# Patient Record
Sex: FEMALE | Race: WHITE | Employment: FULL TIME | URBAN - METROPOLITAN AREA
[De-identification: names, ages, dates, MRNs, and addresses within clinical notes are randomized per-mention and may not be internally consistent; named-entity substitution may affect disease eponyms.]

---

## 2019-07-11 ENCOUNTER — MEDICAL CORRESPONDENCE (OUTPATIENT)
Dept: HEALTH INFORMATION MANAGEMENT | Facility: CLINIC | Age: 64
End: 2019-07-11

## 2019-07-18 ENCOUNTER — REFERRAL (OUTPATIENT)
Dept: TRANSPLANT | Facility: CLINIC | Age: 64
End: 2019-07-18

## 2019-07-18 DIAGNOSIS — Q61.3 POLYCYSTIC KIDNEY: ICD-10-CM

## 2019-07-18 DIAGNOSIS — E66.9 OBESITY: ICD-10-CM

## 2019-07-18 DIAGNOSIS — E11.9 DIABETES MELLITUS, TYPE 2 (H): Primary | ICD-10-CM

## 2019-07-18 DIAGNOSIS — Z01.818 PRE-TRANSPLANT EVALUATION FOR KIDNEY TRANSPLANT: ICD-10-CM

## 2019-07-18 DIAGNOSIS — Z76.82 ORGAN TRANSPLANT CANDIDATE: ICD-10-CM

## 2019-07-18 DIAGNOSIS — I10 HYPERTENSION: ICD-10-CM

## 2019-07-18 NOTE — LETTER
July 25, 2019    Radha De Jesus   [address]  [facility] [address]      Dear Radha,    Thank you for your interest in the Transplant Center at [facility], [facility]. We look forward to being a part of your care team and assisting you through the transplant process.    As we discussed, your transplant coordinator is Ping Randhawa (Kidney).  You may call your coordinator at any time with questions or concerns.   [phone]    Please complete the following.    1. Fill out and return the enclosed forms    Authorization for Electronic Communication    Authorization to Discuss Protected Health Information    Authorization for Release of Protected Health Information    2. Sign up for:    [employer], access to your electronic medical record (see enclosed pamphlet)    [email or web address], a transplant education website    You can use these tools to learn more about your transplant, communicate with your care team, and track your medical details    Sincerely,    Solid Organ Transplant  [facility], [facility]    cc: Maribell Sanchez (PCP), Nithya Steen MD

## 2019-07-18 NOTE — Clinical Note
Adilene gave me dates for Pre K  Mini eval 9/9/ or 9/23.Notified patient that they will hear from a Transplant  to schedule evaluation. Arina wrote orders today 7/25/19.  Patient reports not on dialysis.

## 2019-07-19 VITALS — BODY MASS INDEX: 45.16 KG/M2 | WEIGHT: 230 LBS | HEIGHT: 60 IN

## 2019-07-19 ASSESSMENT — MIFFLIN-ST. JEOR: SCORE: 1524.77

## 2019-07-19 NOTE — TELEPHONE ENCOUNTER
PCP: Maribell Sanchez    Referring Provider: Nithya Steen  Referring Diagnosis:     Insurance information:    Policy palacios:  SELF  Subscriber/policy/ID number:  [id number]  Group Number:  [id number]    Is patient in a group home/assisted living? NO  Does patient have a guardian? NO    Referral intake process completed.  Patient is aware that after financial approval is received, medical records will be requested.   Patient confirmed for a callback from transplant coordinator on 7/30/19 (within 2 weeks)  Tentative evaluation date Not scheduled. (within 4 weeks)    Confirmed coordinator will discuss evaluation process in more detail at the time of their call.   Patient is aware of the need to arrange age appropriate cancer screening, vaccinations, and dental care.  Reminded patient to complete questionnaire, complete medical records release, and review packet prior to evaluation visit .  Assessed patient for special needs (ie--wheelchair, assistance, guardian, and ):  NONE   Patient instructed to call [phone] with questions.

## 2019-07-25 NOTE — TELEPHONE ENCOUNTER
"   I contacted patient and introduced myself( Arina Crabtree RN ) as working with her TX Coordinator(Radha Randhawa)today.I also introduced the role of the Transplant Coordinator in the transplant process.  Explained the purpose of this call including reviewing next steps and answering questions.  Confirmed Referring Provider, Dialysis Center, and Primary Care Physician. Notified patient of the importance of continued communication with referring providers and primary care physicians.    Reviewed components of transplant evaluation process including necessary appointments, tests, and procedures.    Answered questions for patient regarding evaluation, provided Ping Randhawa's  contact information and requested they call with any additional questions.    Determined that patient would like additional information regarding transplant by:      Mail, Phone Call   Adilene gave me dates for Pre K  Mini eval 9/9/ or 9/23.Notified patient that they will hear from a Transplant  to schedule evaluation. Arina wrote orders today 7/25/19.     Patient states she is not on dialysis.Patient verbalized understanding of the plan.    H&P from Dr Hinson s care everywhere   PKD  any family w txs    Type 2 DM    hypertension 1/27/2013  few MRI of brain - NO aneurysm   ESRD on dialysis  Obesity (5' 2.75\")  Wt 107 kg (236 lb)  BMI 42.14 kg/m      Tachycardia 01/27/2013     Depression with anxiety 01/27/2013 On no medications    Thrombocytopenia, unspecified 01/27/2013    Past Medical History:     Colon polyps 2013     Sleep apnea       "

## 2019-09-16 ENCOUNTER — TELEPHONE (OUTPATIENT)
Dept: TRANSPLANT | Facility: CLINIC | Age: 64
End: 2019-09-16

## 2019-09-23 ENCOUNTER — DOCUMENTATION ONLY (OUTPATIENT)
Dept: TRANSPLANT | Facility: CLINIC | Age: 64
End: 2019-09-23

## 2019-09-23 ENCOUNTER — OFFICE VISIT (OUTPATIENT)
Dept: TRANSPLANT | Facility: CLINIC | Age: 64
End: 2019-09-23
Attending: PHYSICIAN ASSISTANT
Payer: COMMERCIAL

## 2019-09-23 ENCOUNTER — ALLIED HEALTH/NURSE VISIT (OUTPATIENT)
Dept: TRANSPLANT | Facility: CLINIC | Age: 64
End: 2019-09-23
Attending: TRANSPLANT SURGERY
Payer: COMMERCIAL

## 2019-09-23 VITALS
HEIGHT: 62 IN | WEIGHT: 237 LBS | WEIGHT: 237 LBS | BODY MASS INDEX: 43.61 KG/M2 | HEIGHT: 62 IN | BODY MASS INDEX: 43.61 KG/M2

## 2019-09-23 DIAGNOSIS — Z76.82 ORGAN TRANSPLANT CANDIDATE: ICD-10-CM

## 2019-09-23 DIAGNOSIS — Z01.818 PRE-TRANSPLANT EVALUATION FOR CHRONIC KIDNEY DISEASE: Primary | ICD-10-CM

## 2019-09-23 DIAGNOSIS — Q61.3 POLYCYSTIC KIDNEY: ICD-10-CM

## 2019-09-23 DIAGNOSIS — Z76.82 ORGAN TRANSPLANT CANDIDATE: Primary | ICD-10-CM

## 2019-09-23 DIAGNOSIS — Z01.818 PRE-TRANSPLANT EVALUATION FOR KIDNEY TRANSPLANT: ICD-10-CM

## 2019-09-23 RX ORDER — AMLODIPINE BESYLATE 5 MG/1
5 TABLET ORAL
COMMUNITY
Start: 2018-11-12

## 2019-09-23 RX ORDER — CARVEDILOL 12.5 MG/1
12.5 TABLET ORAL
COMMUNITY
Start: 2018-11-20

## 2019-09-23 RX ORDER — LISINOPRIL 20 MG/1
TABLET ORAL
COMMUNITY
Start: 2018-10-23

## 2019-09-23 RX ORDER — LANCETS
EACH MISCELLANEOUS
COMMUNITY
Start: 2018-12-05

## 2019-09-23 RX ORDER — MAGNESIUM OXIDE 400 MG/1
TABLET ORAL
COMMUNITY
Start: 2018-09-24

## 2019-09-23 RX ORDER — TRIAMCINOLONE ACETONIDE 1 MG/G
OINTMENT TOPICAL
COMMUNITY
Start: 2019-09-06

## 2019-09-23 RX ORDER — ATORVASTATIN CALCIUM 10 MG/1
10 TABLET, FILM COATED ORAL
COMMUNITY
Start: 2019-05-13

## 2019-09-23 RX ORDER — BLOOD SUGAR DIAGNOSTIC
STRIP MISCELLANEOUS
Refills: 0 | COMMUNITY
Start: 2018-12-05

## 2019-09-23 RX ORDER — FUROSEMIDE 20 MG
20 TABLET ORAL
COMMUNITY
Start: 2019-08-09

## 2019-09-23 RX ORDER — NATEGLINIDE 60 MG/1
60 TABLET ORAL
COMMUNITY
Start: 2019-08-19

## 2019-09-23 RX ORDER — INSULIN PUMP SYRINGE, 3 ML
EACH MISCELLANEOUS
COMMUNITY
Start: 2018-12-05

## 2019-09-23 ASSESSMENT — MIFFLIN-ST. JEOR
SCORE: 1588.27
SCORE: 1591.52

## 2019-09-23 NOTE — PROGRESS NOTES
Outpatient MNT: Kidney Transplant Evaluation/Weight Loss Clinic Hybrid    Current BMI: 43.3 (HT 62 in,  lbs/108 kg)  BMI is outside criteria of <35 for kidney transplant  Goal BMI<35 for kidney transplant or <191 lbs (46 lb loss)     Time Spent: 15 minutes  Visit Type: Initial  Referring Physician: Finger  Pt accompanied by: her , Ventura     Medical dx associated with RD referral  - DM II  - CKD IV from PKD    History of previous txp: none  Dialysis: no    Pt may be interested in sleeve gastrectomy with Dr Elmore. This is pt's first of 3 out of 6 RD visits required prior to surgery.    Required weight loss goal prior to weight loss surgery: weight maintenance    Nutrition Assessment  Pt cooks at home, following a low sodium, lower protein diet. In general, she does not prefer meat and eats meat 2x/month. She does not eat soy. Does some eggs, dairy, beans/lentils.     Vitamins, Supplements, Pertinent Meds: magnesium   Herbal Medicines/Supplements: none     Pt reports attending a lifestyle change program 4 years ago, losing 15 lbs x 2 weeks, maintaining weight loss x 1 year. This was reportedly difficult to maintain.     Diet Recall  Breakfast Plain oats made with water + 1-2 toast with butter + 1 piece of fruit (banana, berries, apple)   Lunch Deli meat s/w (not LS) + apple, carrots    Dinner Potatoes (4x/week) or pasta + veggies or salad (trying to increase)   Snacks Chocolate a few times/week    Beverages Water, occasional milk    Alcohol None    Dining out 2x/month      Physical Activity  Just finished PT after knee surgery last week  Plans to start routine activity, perhaps pool therapy a few times/week      Anthropometrics  Height:   62 in   BMI:    43.3    Weight Status:Obesity Grade III BMI >40   Weight:  237 lbs            IBW (lb): 110  % IBW: 215    Wt Hx: Pt reports mild MEHUL, with overall stable weight.     Adj/dosing BW: 142 lbs/64 kg       Malnutrition  % Intake: No decreased intake noted  %  Weight Loss: None noted  Subcutaneous Fat Loss: None  Muscle Loss: None  Fluid Accumulation/Edema: Mild   Malnutrition Diagnosis: Patient does not meet two of the above criteria necessary for diagnosing malnutrition     Estimated Nutrition Needs  Energy  1280     (20 kcal/kg dosing BW for desired wt loss)       Protein  38-51    (0.6-0.8 g/kg for CKD)         Fluid  1 ml/kcal or per MD   Micronutrient   Na+: <2000 mg/day  K+: 7300-8039 mg/day  Phos: 800-1000 mg/day            Nutrition Diagnosis  Obesity r/t long history of self-monitoring deficit and excessive energy intake aeb BMI >30.    Food and nutrition related knowledge deficit r/t pre kidney transplant eval AEB pt verbalized not hearing pre/post transplant diet guidelines.    Nutrition Intervention  Nutrition education provided:  Discussed strategies for weight loss:   - Pt thinks reducing her work hours will allow her to focus more on lifestyle changes. She may be interested in med management or weight loss surgery, but would like to see if she could do it on her own first. We discussed that ideally she should include more proteins in her diet in order to reduce emphasis of carbs at meal times. Could consider eggs with toast and fruit instead of 3 carbs at breakfast (or Greek yogurt).     Discussed sodium intake (low sodium foods and drinks, seasoning food without salt and tips for low sodium diet). Discussed borderline elevated K level (5.3) on 7/8, with h/o high levels since 12/2018. Would limit intake of bananas and tomatoes. Also reviewed difference b/t animal and plant proteins in relation to kidney disease, yet generally speaking, should avoid exceeding much more than 50 g/day total protein.     Emailed pt resources: diabetic and kidney friendly cook books, recipes/meal ideas, seasonings to use instead of salt, and low/high K foods.     Reviewed post txp diet guidelines in brief (will review in further detail post txp):  (1) Review of proper food  safety measures d/t immunosuppressant therapy post-op and increased risk for food-borne illness    (2) Avoid the following post txp d/t risk for rejection, unknown effects on the organs, and/or potential interactions with immunosuppressants:  - Herbal, Chinese, holistic, chiropractic, natural, alternative medicines and supplements  - Detoxes and cleanses  - Weight loss pills  - Protein powders or other products with extracts or herbs (ie green tea extract)    (3) Med regimen and possible side effects    Patient Understanding: Pt verbalized understanding of education provided.  Expected Compliance: Good  Follow-Up Plans: 1 month or PRN    Nutrition Goals  Transplant-Related  1. Weight loss to goal wt: <191 lbs (BMI <35 or per MD) for transplant and weight maintenance for potential weight loss surgery  2. Limit Na+ <2000mg/day  3. Pt to verbalize understanding of 3 aspects of post txp education provided  ______________________________________________________________________  Bariatric Surgery-Related  1. Eat slowly (20-30 minutes per meal), chewing foods well (25 chews per bite of food)  2. Eliminate calorie-containing beverages  3. Plate method (1/2 non-starchy vegetables/fruit (kidney friendly if applicable), 1/4 lean protein, 1/4 whole grain starch - no more than 1 cup carb/meal)  4. Increase activity level.     Provided pt with contact info.   Jaelyn Guillaume RD, LD  [facility] [phone]

## 2019-09-23 NOTE — LETTER
2019       RE: Radha De Jesus  [address]  [facility] [address]     Dear Colleague,    Thank you for referring your patient, Radha De Jesus, to the [facility] SOLID ORGAN TRANSPLANT at [facility]. Please see a copy of my visit note below.      Transplant Surgery      Reason For Visit: Pre-evaluation for kidney transplant waiting list    History of Present Illness:  CKD from PCK and Type II DM.  Referred for kidney transplant evalulation, but BMI 42.  Will need to lose 30 lb to get to BMI 35.         History of seizures in past.    History of recurrent UTIs.  On occasional prophylactic ABX.    Exam:   There were no vitals taken for this visit.  Well appearing no apparent distress     Impression:  Will likely be a candidate for transplant in the future if she can lose weight to BMI 35.  Renal disease is more pressing than her diabetes, so I favor kidney alone.  If she does a good job at losing weight and gets to BMI 32 prior to achieving transplant, could reconsider simultaneous kidney and pancreas transplant.    Plan: Weight management evaluation.  List inactive when she loses 10 lb or if she engages in ongoing weight management plan.    Discussion:  Ms. De Jesus appears to be a fair candidate for kidney transplantation and has a good understanding of the risks and benefits of this approach to the management of renal failure.     The majority of our visit was spent in counselling, discussing the medical and surgical risks of kidney transplantation. We discussed approximate wait time and how that is influenced by issues such as blood type and sensitization (PRA) and access to a living donor. I contrasted potential waiting time for living vs  donor kidneys from  normal (0-85%) or higher (%) kidney donor profile index (KDPI) donors and their associated outcomes. I would recommend this individual to consider kidneys from high KDPI donors. Potential surgical complications of  kidney transplantation include bleeding, superficial or deep wound complications (infection, hernia, lymphocele), ureteral anastomotic failure (leak or stenosis), graft thrombosis, need for reoperation and other issues such as cardiac complications, pneumonia, deep venous thrombosis, pulmonary embolism, post transplant diabetes and death. The potential for recurrent disease or need for retransplantation was also addressed. We discussed the possible need for ureteral stent (and subsequent removal), and the utility of protocol biopsy and laboratory studies to evaluate for rejection or recurrent disease. We discussed the risk of graft rejection, our center's average graft and patient survival rates, immunosuppression protocols, as well as the potential opportunity to participate in clinical trials.  We also discussed the average length of stay, recovery process, and posttransplant lab and monitoring protocol.  I emphasized the need for strict immunosuppression medication adherence and the potential for complications of immunosuppression such as skin cancer or lymphoma, as well as a very low but not zero risk of donor-derived disease transmission risks (infection, cancer). Ms. De Jesus asked good questions and her candidacy will be reviewed at our Multidisciplinary Selection Committee. Thank you for the opportunity to participate in Ms. De Jesus's care.    Total time 30 min.  Face to face time: 20 min.    Jack Altamirano MD, PhD  Transplant Surgery

## 2019-09-23 NOTE — PATIENT INSTRUCTIONS
Go online to [email or web address] to watch online seminar. SLEEVE GASTRECTOMY    Consider topiramate, discuss with nephrologist    If interested in sleeve gastrectomy surgery call Lalo Welch [phone] to schedule to see Dr Elmore and dietitian in 1 month (meet and greet)    Please call Cassie George LPN at [phone] OR Kareen Silveira [phone] if you would like to start topiramate.    If interested in medical weight management only call [phone] to schedule return medical weight management visit with Starr Beal and dietitian in 1 month.        MEDICATION DISCUSSED AT THIS APPOINTMENT  Start one tab, 25 mg, for a week. Go up to 50 mg (2 tabs) for the next week. At the third week, take  3 tabs (75 mg).  Stay at 3 tabs until you are seen again.     For any questions/concerns contact Cassie George LPN at [phone] or Kareen Silveira RN at [phone]    (Do not stop taking it if you don't think it's working. For some people it works even though they do not feel much different.)    Topiramate (Topamax) is a medication that is used most often to treat migraine headaches or for seizures. It has also been found to help with weight loss. Although it's not currently FDA approved for weight loss, it has been used safely for a number of years to help people who are carrying extra weight.     Just how topiramate helps with weight loss has not been exactly determined. However it seems to work on areas of the brain to quiet down signals related to eating.      Topiramate may make you:    >feel less interest in eating in between meals   >think less about food and eating   >find it easier to push the plate away   >find giving up pop easier    >have an easier time eating less    For some of our patients, the pills work right away. They feel and think quite differently about food. Other patients don't feel much of a change but find in fact they have lost weight! Like all weight loss medications, topiramate works best  when you help it work.  This means:    1) Have less tempting high calorie (fattening) food around the house or office    2) Have lower calorie food (fruits, vegetables,low fat meats and dairy) for snacks    3) Eat out only one time or less each week.   4) Eat your meals at a table with the TV or computer off.    Side-effects. Topiramate is generally well tolerated. The main side-effects we see are:   Tingling in hands,feet, or face (usually not very troublesome)   Mental confusion and word finding trouble (about 10% of patients have this.)     Feeling sleepy or a bit dopey- this goes away very soon after starting.    One of the dangers of topiramate is the possibility of birth defects--if you get pregnant when you are on it, there is the risk that your baby will be born with a cleft lip or palate.  If you are on topiramate and of child bearing age, you need to be on a reliable form of birth control or refrain from sexual intercourse.     Please refer to the pharmacy insert for more information on side-effects. Since many pharmacists are not familiar with the use of topiramate in weight loss, calling the clinic will get you the most accurate information on the use of this medication for weight loss.     In order to get refills of this or any medication we prescribe you must be seen in the medical weight mgmt clinic every 2-3 months. Please have your pharmacy fax a refill request to [phone].

## 2019-09-23 NOTE — LETTER
2019       RE: Radha De Jesus  [address]  [facility] [address]     Dear Colleague,    Thank you for referring your patient, Radha De Jesus, to the [facility] SOLID ORGAN TRANSPLANT at [facility]. Please see a copy of my visit note below.    PATIENT:  Radha De Jesus  :          1955  FARRUKH:          2019    The patient is seen at the consultation request of Dr. Dr Altamirano for obesity.    Patient is pursuing transplant of kidney and needs to lose 46 pounds prior to surgery.    Polycystic kidney disease(PKD).  Stage V kidney disease, not yet on dialysis.    Type 2 DM Dx and started on Starlex in May 2019  A1C has gone from 7.9 to 7.0    Needs to lose to 194 lb to get to BMI <35   Current BMI: 43.3 (HT 62 in,  lbs/108 kg)  BMI is outside criteria of <35 for kidney transplant  Goal BMI<35 for kidney transplant or <191 lbs (46 lb loss)    She has tried lifestyle center and lost 15 lbs. The first 2.5 weeks 4 yrs ago but she had trouble maintaining weight loss    15 lbs is most weight loss ever. Struggled with weight since childhood. Highest weight ever is today's weight    She describes her weight history as a gradual gain.  Her weight gain started as an adolescent.    Her previous attempts at weight loss include medical supervision. Patient has had minimal success losing 15 lbs but unable to maintain weight loss.    Today's weight: 237 lbs 0 oz  Current Body Mass Index:  Body mass index is 43.06 kg/m .    Past Medical History:   Diagnosis Date     Diabetes (H) 2019    Type 2  NIDDM     History of blood transfusion      Hypertension      PKD (polycystic kidney disease)        Work/Social History:  She describes her home life as stable. Resides in an independent environment. Her marital status is . She has no overweight/obese child or spouse. She reports that she has never smoked. She has never used smokeless tobacco. She reports previous alcohol use. She  reports that she does not use drugs.    Diet Recall  Breakfast Plain oats made with water + 1-2 toast with butter + 1 piece of fruit (banana, berries, apple)   Lunch Deli meat s/w (not LS) + apple, carrots    Dinner Potatoes (4x/week) or pasta + veggies or salad (trying to increase)   Snacks Chocolate a few times/week    Beverages Water, occasional milk    Alcohol None    Dining out 2x/month      Radha has an activity pattern that is sedentary.     ASSESSMENT:  Radha is a patient with mature onset obesity without significant element of familial/genetic influence and with current health consequences. She does need aggressive weight loss plan due to BMI 43 and need to lose weight to BMI <35 for kidney transplant. Stage 5 CKD due to PKD.    PLAN:      Go online to [email or web address] to watch online seminar. SLEEVE GASTRECTOMY    Consider topiramate, discuss with nephrologist. Consider GLP1 instead of Starlix to help with DM and weight loss.    If interested in sleeve gastrectomy surgery call Lalo Welch [phone] to schedule to see Dr Elmore and dietitian in 1 month (meet and greet)    Please call Cassie George LPN at [phone] OR Kareen Silveira [phone] if you would like to start topiramate.    If interested in medical weight management only call [phone] to schedule return medical weight management visit with Starr Beal and dietitian in 1 month.    Please see Lauren Bloch in 1 month to discuss weight loss medications topiramate vs GLP1.  Can be same day as visit with Dr Elmore or Starr.        I spent a total of 30 minutes face to face with Radha PRADIP De Jesus during today's office visit.  Over 50% of this time was spent counseling the patient and/or coordinating care.    Starr Beal PA-C

## 2019-09-23 NOTE — PROGRESS NOTES
Transplant Surgery      Reason For Visit: Pre-evaluation for kidney transplant waiting list    History of Present Illness:  CKD from PCK and Type II DM.  Referred for kidney transplant evalulation, but BMI 42.  Will need to lose 30 lb to get to BMI 35.         History of seizures in past.    History of recurrent UTIs.  On occasional prophylactic ABX.    Exam:   There were no vitals taken for this visit.  Well appearing no apparent distress     Impression:  Will likely be a candidate for transplant in the future if she can lose weight to BMI 35.  Renal disease is more pressing than her diabetes, so I favor kidney alone.  If she does a good job at losing weight and gets to BMI 32 prior to achieving transplant, could reconsider simultaneous kidney and pancreas transplant.    Plan: Weight management evaluation.  List inactive when she loses 10 lb or if she engages in ongoing weight management plan.    Discussion:  Ms. De Jesus appears to be a fair candidate for kidney transplantation and has a good understanding of the risks and benefits of this approach to the management of renal failure.     The majority of our visit was spent in counselling, discussing the medical and surgical risks of kidney transplantation. We discussed approximate wait time and how that is influenced by issues such as blood type and sensitization (PRA) and access to a living donor. I contrasted potential waiting time for living vs  donor kidneys from  normal (0-85%) or higher (%) kidney donor profile index (KDPI) donors and their associated outcomes. I would recommend this individual to consider kidneys from high KDPI donors. Potential surgical complications of kidney transplantation include bleeding, superficial or deep wound complications (infection, hernia, lymphocele), ureteral anastomotic failure (leak or stenosis), graft thrombosis, need for reoperation and other issues such as cardiac complications, pneumonia, deep venous  thrombosis, pulmonary embolism, post transplant diabetes and death. The potential for recurrent disease or need for retransplantation was also addressed. We discussed the possible need for ureteral stent (and subsequent removal), and the utility of protocol biopsy and laboratory studies to evaluate for rejection or recurrent disease. We discussed the risk of graft rejection, our center's average graft and patient survival rates, immunosuppression protocols, as well as the potential opportunity to participate in clinical trials.  We also discussed the average length of stay, recovery process, and posttransplant lab and monitoring protocol.  I emphasized the need for strict immunosuppression medication adherence and the potential for complications of immunosuppression such as skin cancer or lymphoma, as well as a very low but not zero risk of donor-derived disease transmission risks (infection, cancer). Ms. De Jesus asked good questions and her candidacy will be reviewed at our Multidisciplinary Selection Committee. Thank you for the opportunity to participate in Ms. De Jesus's care.    Total time 30 min.  Face to face time: 20 min.    Jack Altamirano MD, PhD  Transplant Surgery

## 2019-09-23 NOTE — PATIENT INSTRUCTIONS
Your BMI today is 43.3 (237 lbs)  Goal BMI for kidney transplant <35 or <191 lbs (46 lb weight loss)    Nutrition Goals  1. Eat slowly (20-30 minutes per meal), chewing foods well (25 chews per bite of food)    2. Eliminate calorie-containing beverages    3. Plate method (1/2 non-starchy vegetables/fruit (kidney friendly if applicable), 1/4 lean protein, 1/4 starch (kidney friendly if applicable) - no more than 1 cup carb/meal)    Low potassium vegetables: asparagus, beets, raw broccoli, cabbage, cooked carrots, cauliflower, celery, cucumbers, eggplant, green/wax beans, lettuce, mushrooms, onion, peppers, raw spinach (1/2 cup), zucchini    Low potassium fruit: canned apricot (1/2 cup), apple/applesauce, blueberries, cherries, cranberries, fruit cocktail (1 cup), grapefruit, grapes, mandarin oranges, peaches, pears, plums, pineapple, raspberries, rhubarb, strawberries, tangerine, watermelon (1 cup)    Kidney friendly starches: barley, buckwheat, bulgur, popcorn, wild rice, white rice, pasta    4. Increase activity level.     Jaelyn Guillaume RD, LD  [phone]  [email or web address]

## 2019-09-23 NOTE — PROGRESS NOTES
PATIENT:  Radha De Jesus  :          1955  FARRUKH:          2019    The patient is seen at the consultation request of Dr. Dr Altamirano for obesity.    Patient is pursuing transplant of kidney and needs to lose 46 pounds prior to surgery.    Polycystic kidney disease(PKD).  Stage V kidney disease, not yet on dialysis.    Type 2 DM Dx and started on Starlex in May 2019  A1C has gone from 7.9 to 7.0    Needs to lose to 194 lb to get to BMI <35   Current BMI: 43.3 (HT 62 in,  lbs/108 kg)  BMI is outside criteria of <35 for kidney transplant  Goal BMI<35 for kidney transplant or <191 lbs (46 lb loss)    She has tried [facility] and lost 15 lbs. The first 2.5 weeks 4 yrs ago but she had trouble maintaining weight loss    15 lbs is most weight loss ever. Struggled with weight since childhood. Highest weight ever is today's weight    She describes her weight history as a gradual gain.  Her weight gain started as an adolescent.    Her previous attempts at weight loss include medical supervision. Patient has had minimal success losing 15 lbs but unable to maintain weight loss.    Today's weight: 237 lbs 0 oz  Current Body Mass Index:  Body mass index is 43.06 kg/m .    Past Medical History:   Diagnosis Date     Diabetes (H) 2019    Type 2  NIDDM     History of blood transfusion      Hypertension      PKD (polycystic kidney disease)        Work/Social History:  She describes her home life as stable. Resides in an independent environment. Her marital status is . She has no overweight/obese child or spouse. She reports that she has never smoked. She has never used smokeless tobacco. She reports previous alcohol use. She reports that she does not use drugs.    Diet Recall  Breakfast Plain oats made with water + 1-2 toast with butter + 1 piece of fruit (banana, berries, apple)   Lunch Deli meat s/w (not LS) + apple, carrots    Dinner Potatoes (4x/week) or pasta + veggies or salad (trying to  increase)   Snacks Chocolate a few times/week    Beverages Water, occasional milk    Alcohol None    Dining out 2x/month      Radha has an activity pattern that is sedentary.     ASSESSMENT:  Radha is a patient with mature onset obesity without significant element of familial/genetic influence and with current health consequences. She does need aggressive weight loss plan due to BMI 43 and need to lose weight to BMI <35 for kidney transplant. Stage 5 CKD due to PKD.    PLAN:      Go online to www.[facility].org to watch online seminar. SLEEVE GASTRECTOMY    Consider topiramate, discuss with nephrologist. Consider GLP1 instead of Starlix to help with DM and weight loss.    If interested in sleeve gastrectomy surgery call Lalo Welch [phone] to schedule to see Dr Elmore and dietitian in 1 month (meet and greet)    Please call Cassie George LPN at [phone] OR Kareen Silveira -[phone] if you would like to start topiramate.    If interested in medical weight management only call [phone] to schedule return medical weight management visit with Starr Beal and dietitian in 1 month.    Please see Lauren Bloch in 1 month to discuss weight loss medications topiramate vs GLP1.  Can be same day as visit with Dr Elmore or Starr.        I spent a total of 30 minutes face to face with Radhapepe De Jesus during today's office visit.  Over 50% of this time was spent counseling the patient and/or coordinating care.    Starr Beal PA-C

## 2019-09-23 NOTE — PROGRESS NOTES
"Kidney Transplant Evaluation - 9/23/2019  Radha De Jesus attended the pre-transplant patient education class today. She was accompanied by her  and daughter. They were all attentive and asked a few good questions. The [facility] website pre-transplant modules were viewed; class participants were educated on using the site.     Content reviewed:    Living Donation and how to access that program    Paired exchange    Kidney Donor Profile Index (KDPI)    Waiting list issues (right to decline without penalty, high PHS risk donors, what to expect when called with an offer)    Hospital experience,  length of stay , need to stay locally post-discharge (2-4 weeks)    Surgical options (with pictures)                             Post-surgery lifting and driving restrictions    Post-transplant routines, frequency of lab work and clinic visits    Need to stay locally post-discharge (2-4 weeks)    Role of Transplant Coordinator    Participants were informed of the benefits of transplant as well as potential risks such as infection, cancer, and death.  The need for total adherence with immunosuppression medications and following transplant regimens was stressed.  The overall evaluation/approval/listing process was reviewed.        The patient was provided with the following documents:  What You Need to Know About a Kidney Transplant  Adult Kidney Transplant - A Guide for Patients  SRTR Data Sheet - Kidney  Brochure - Kidney Allocation  Brochure - Multiple Listing and Waiting Time Transfer  What Every Patient Needs to Know (UNOS)  UNOS Facts and Figures  Finding a Donor  [facility] - Quick Start Guide  KDPI Consent  Receipt of Information form    Radha De Jesus signed the  Receipt of Information for Organ Transplant Recipient.\" She was provided Ping Randhawa's business card and instructed to call with additional questions.      Summary    Team s concerns/comments: BMI >42    Candidacy category: " Red    Action/Plan: see bariatrics and transplant surgeon this afternoon    Expected Selection Meeting Discussion: October 2, 2019

## 2019-09-23 NOTE — PROGRESS NOTES
"Kidney Transplant Evaluation - 9/23/2019  Radha De Jesus attended the pre-transplant patient education class today. She was attentive and asked a few good questions. The [facility] website pre-transplant modules were viewed; class participants were educated on using the site.     Content reviewed:    Living Donation and how to access that program    Paired exchange    Kidney Donor Profile Index (KDPI)    Waiting list issues (right to decline without penalty, high PHS risk donors, what to expect when called with an offer)    Hospital experience,  length of stay , need to stay locally post-discharge (2-4 weeks)    Surgical options (with pictures)                             Post-surgery lifting and driving restrictions    Post-transplant routines, frequency of lab work and clinic visits    Need to stay locally post-discharge (2-4 weeks)    Role of Transplant Coordinator    Participants were informed of the benefits of transplant as well as potential risks such as infection, cancer, and death.  The need for total adherence with immunosuppression medications and following transplant regimens was stressed.  The overall evaluation/approval/listing process was reviewed.        The patient was provided with the following documents:  What You Need to Know About a Kidney Transplant  Adult Kidney Transplant - A Guide for Patients  SRTR Data Sheet - Kidney  Brochure - Kidney Allocation  Brochure - Multiple Listing and Waiting Time Transfer  What Every Patient Needs to Know (UNOS)  UNOS Facts and Figures  Finding a Donor  [facility]plant [facility] - Quick Start Guide  [facility] Consent  Receipt of Information form    Radha De Jesus signed the  Receipt of Information for Organ Transplant Recipient.\" She was provided Ping Randhawa's business card and instructed to call with additional questions.      Summary    Team s concerns/comments: Radha was here for a mini evaluation due to BMI. She attended class and will see bariatrics, " surgeon and dietician later this afternoon.    Candidacy category: Red    Action/Plan: Continue with mini evaluation    Expected Selection Meeting Discussion: [place] 10/2/2019

## 2019-10-02 ENCOUNTER — COMMITTEE REVIEW (OUTPATIENT)
Dept: TRANSPLANT | Facility: CLINIC | Age: 64
End: 2019-10-02

## 2019-10-02 NOTE — LETTER
10/02/2019       Radha De Jesus   [phone]  [facility] [address]    Dear Radha,    It was a pleasure to see you for consideration of kidney transplantation. Your pre-transplant evaluation results were reviewed at our Multidisciplinary Selection Committee. The committee is requesting the following items are completed before determining your candidacy:    1. To return to Weight management clinic with Starr Mercer CNP     For any questions, please contact the Transplant Office at [phone].      Sincerely,    Ping Randhawa RN BSN on behalf of   Solid Organ Kidney Transplant Program   [facility]

## 2019-11-06 NOTE — COMMITTEE REVIEW
Abdominal Committee Review Note     Evaluation Date: 9/23/2019  Committee Review Date: 10/2/2019    Organ being evaluated for: Kidney    Transplant Phase: Evaluation  Transplant Status: Active    Transplant Coordinator: Ping Randhawa  Transplant Surgeon:   Jack Altamirano    Referring Physician: Nithya Steen    Primary Diagnosis:   Secondary Diagnosis:     Committee Review Members:  Nephrology Raghu Tobar MD, Shade Jones, APRN CNP, Justin Kothari MD   Nurse Arina Crabtree, RN, Greer Sifuentes, RN   Nutrition Jaelyn Guillaume,    Pharmacy Wolf Giles, [facility]    - Clinical Shoshana Leon, [facility], Jackieanselmo Santiago, [facility]   Transplant Izabella Shukla PA-C, Renetta Kellogg RN, Adilene Cobb, JHONY, Nyasia Collazo, THOMAS, Ping Randhawa, THOMAS, Bishop Green MD, Greer Sifuentes, RN   Transplant Surgery Lisa Bermudez MD, MD       Transplant Eligibility: Irreversible chronic kidney disease treated w/dialysis or expected need for dialysis    Committee Review Decision: Needs Re-presentation    Relative Contraindications: Other, obesity    Absolute Contraindications: Other, obesity    Committee Chair Bishop Green MD verbally attested to the committee's decision.    Committee Discussion rediscuss         Team s concerns/comments: Radha was here for a mini evaluation due to BMI. She attended class and will see bariatrics, surgeon and dietician later this afternoon.

## 2019-11-20 ENCOUNTER — TELEPHONE (OUTPATIENT)
Dept: SURGERY | Facility: CLINIC | Age: 64
End: 2019-11-20

## 2019-11-20 NOTE — TELEPHONE ENCOUNTER
Left VM message inquiring if patient was interested in pursuing bariatric surgery or MWM. If surgery, I need to submit referral to [facility] to get set up for the [facility] phone program. Direct call back number given.

## 2019-11-21 ENCOUNTER — TELEPHONE (OUTPATIENT)
Dept: SURGERY | Facility: CLINIC | Age: 64
End: 2019-11-21

## 2019-12-02 ENCOUNTER — OFFICE VISIT - HEALTHEAST (OUTPATIENT)
Dept: SURGERY | Facility: CLINIC | Age: 64
End: 2019-12-02

## 2019-12-02 DIAGNOSIS — N18.6 END STAGE RENAL DISEASE (H): ICD-10-CM

## 2019-12-31 ASSESSMENT — MIFFLIN-ST. JEOR: SCORE: 1538.02

## 2020-01-01 ENCOUNTER — ANESTHESIA - HEALTHEAST (OUTPATIENT)
Dept: SURGERY | Facility: HOSPITAL | Age: 65
End: 2020-01-01

## 2020-01-02 ENCOUNTER — SURGERY - HEALTHEAST (OUTPATIENT)
Dept: SURGERY | Facility: HOSPITAL | Age: 65
End: 2020-01-02

## 2020-01-20 ENCOUNTER — OFFICE VISIT - HEALTHEAST (OUTPATIENT)
Dept: SURGERY | Facility: CLINIC | Age: 65
End: 2020-01-20

## 2020-01-20 DIAGNOSIS — N18.6 END STAGE RENAL DISEASE (H): ICD-10-CM

## 2020-02-12 ENCOUNTER — TELEPHONE (OUTPATIENT)
Dept: SURGERY | Facility: CLINIC | Age: 65
End: 2020-02-12

## 2020-02-12 NOTE — TELEPHONE ENCOUNTER
"I checked [facility] Well web site today, patient did 2 [facility] calls, then notation is \"discontinue course, ineligible.\" I spoke with Sabra from [employer]. Patient's policy does not cover bariatric surgery.   "

## 2020-02-26 ENCOUNTER — TELEPHONE (OUTPATIENT)
Dept: TRANSPLANT | Facility: CLINIC | Age: 65
End: 2020-02-26

## 2020-02-26 NOTE — TELEPHONE ENCOUNTER
"From: Lalo Welch had said the patient was going to call me if she wanted to pursue surgery. I hadn't heard from her for 2 months so reached out to her.  There's an exclusion main regarding weight surgery. The only option for her would be if she changed insurance at some point.     I called Radha De Jesus to discuss her Current weight and our possible     1) PD daily  2) 2/26/2020   Ht 5'1 1/2\"  .5# BMI 41   3) I discussed our kidney team possible change in the body mass index guidelines   4) I called Callum about INS coverage, He will look into coverage and let Ping know by 3/3/2020  5) I will write order in Epic for surgeon appt and nutrition to assess our program's BMI guidelines for doing evaluation in future.  ( as Pt's INS has exclusional main to NOT cover sleeve gastrectomy surgery.         "

## 2020-03-06 ENCOUNTER — TELEPHONE (OUTPATIENT)
Dept: TRANSPLANT | Facility: CLINIC | Age: 65
End: 2020-03-06

## 2021-06-03 VITALS
HEART RATE: 56 BPM | OXYGEN SATURATION: 98 % | WEIGHT: 229 LBS | BODY MASS INDEX: 41.88 KG/M2 | SYSTOLIC BLOOD PRESSURE: 128 MMHG | DIASTOLIC BLOOD PRESSURE: 70 MMHG

## 2021-06-03 VITALS — BODY MASS INDEX: 42.14 KG/M2 | HEIGHT: 62 IN | WEIGHT: 229 LBS

## 2021-06-04 VITALS
WEIGHT: 231 LBS | HEART RATE: 71 BPM | OXYGEN SATURATION: 97 % | DIASTOLIC BLOOD PRESSURE: 66 MMHG | BODY MASS INDEX: 42.59 KG/M2 | SYSTOLIC BLOOD PRESSURE: 124 MMHG

## 2021-06-04 NOTE — PROGRESS NOTES
[facility] Consult    HPI:    64 y.o. year old female who I have been consulted by Nithya Reeder MD for evaluation of Consult (Peritoneal catheter placement )  I been asked to see the patient today in clinic for evaluation of peritoneal dialysis catheter placement.  She is a patient of Dr. Nithya Reeder and also Dr. Nithya Steen is her nephrologist.  After looking her options she is working on a transplant evaluation and will likely need dialysis but wants to have a peritoneal dialysis catheter placed to initiate her dialysis.  She is gone to the course already with the peritoneal dialysis  and has a good understanding of what the options are and how it works.    Allergies:Ciprofloxacin and Sulfa (sulfonamide antibiotics)    Past Medical History:   Diagnosis Date     Hypertension      Sleep apnea     Uses CPAP       Past Surgical History:   Procedure Laterality Date     APPENDECTOMY        SECTION, CLASSIC       HERNIA REPAIR         CURRENT MEDS:  Current Outpatient Medications on File Prior to Visit   Medication Sig Dispense Refill     amLODIPine (NORVASC) 5 MG tablet Take 5 mg by mouth.       atorvastatin (LIPITOR) 10 MG tablet Take 10 mg by mouth.       blood glucose test (ACCU-CHEK DESIREE PLUS TEST STRP) strips        blood-glucose meter (ACCU-CHEK DESIREE PLUS METER) Misc        carvedilol (COREG) 12.5 MG tablet Take 12.5 mg by mouth.       cephalexin (KEFLEX) 250 MG capsule 1 capsule daily at bedtime or as directed.       furosemide (LASIX) 20 MG tablet Take 20 mg by mouth.       lisinopril (PRINIVIL,ZESTRIL) 20 MG tablet Take 30 mg by mouth daily       magnesium oxide (MAG-OX) 400 mg (241.3 mg magnesium) tablet OTC (dose change to) one tab twice daily       nateglinide (STARLIX) 60 MG tablet Take 60 mg by mouth.       sodium bicarbonate 650 MG tablet   11     triamcinolone (KENALOG) 0.1 % ointment Apply topically.       No current facility-administered medications on file prior to  visit.        History reviewed. No pertinent family history.     reports that she has never smoked. She has never used smokeless tobacco. She reports previous alcohol use. She reports that she does not use drugs.    Review of Systems:  Negative except end-stage renal disease need for dialysis.     OBJECTIVE:  Vitals:    12/02/19 1002   BP: 128/70   Patient Site: Right Arm   Patient Position: Sitting   Cuff Size: Adult Regular   Pulse: (!) 56   SpO2: 98%   Weight: (!) 229 lb (103.9 kg)     There is no height or weight on file to calculate BMI.    EXAM:  GENERAL: This is a well-developed 64 y.o. female who appears her stated age  HEAD: normocephalic  HEENT: Pupils equal and reactive bilaterally  MOUTH: mucus membranes intact  CARDIAC: RRR without murmur  CHEST/LUNG:  Clear to auscultation  ABDOMEN: Soft, nontender, nondistended, no masses  EXTREMITIES: Grossly normal, warm,   NEUROLOGIC: Focally intact, nonfocal  INTEGUMENT: No open lesions or ulcers  VASCULAR: Pulses intact, symmetrical upper and lower extremities.        LABS:  No results found for: WBC, HGB, HCT, MCV, PLT  INR/Prothrombin Time      No results found for: HGBA1C  No results found for: ALT, AST, GGT, ALKPHOS, BILITOT         Assessment/Plan:   1. End stage renal disease (H)  We discussed today about the options of peritoneal dialysis she like a catheter placed  - Verify informed consent for procedure; Standing  - Verify informed consent for Anesthesia; Standing  - Skin prep - Clip hair as needed; Standing  - Apply 2% Chlorhexidine Gluconate cloth ([employer]air Cloth) to surgical area.; Standing  - Insert and maintain IV; Standing  - sodium chloride bacteriostatic 0.9 % injection 0.1-0.3 mL  - sodium chloride flush 3 mL (NS)  - NaCl 0.9% IR 0.9 % irrigation solution 1,000 mL (NS)  - Case Request: INSERTION, CATHETER, PERITONEAL, LAPAROSCOPIC; Standing  - Place sequential compression device; Standing  - ceFAZolin 2 g in dextrose 5% 100 mL (ANCEF)  - Case  Request: INSERTION, CATHETER, PERITONEAL, LAPAROSCOPIC      No follow-ups on file.     Rambo Potter MD  [facility] Department of Surgery

## 2021-06-04 NOTE — ANESTHESIA PREPROCEDURE EVALUATION
Anesthesia Evaluation      Patient summary reviewed     Airway   Mallampati: II  Neck ROM: full   Pulmonary     breath sounds clear to auscultation  (+) sleep apnea on CPAP, ,                          Cardiovascular   Exercise tolerance: > or = 4 METS  (+) hypertension, ,     Rhythm: regular  Rate: normal,         Neuro/Psych    (+) depression, anxiety/panic attacks,     Endo/Other    (+) diabetes mellitus type 2, arthritis,      GI/Hepatic/Renal    (+)   chronic renal disease ESRD,      Other findings: AD polycystic kidney disease with brain aneurysm ruled out per patient   K 4.5  Cr 3.37      Dental                         Anesthesia Plan  Planned anesthetic: general endotracheal    ASA 3   Induction: intravenous   Anesthetic plan and risks discussed with: patient and spouse    Post-op plan: routine recovery

## 2021-06-04 NOTE — ANESTHESIA POSTPROCEDURE EVALUATION
Patient: Radha De Jesus  INSERTION, CATHETER, PERITONEAL, LAPAROSCOPIC, LYSIS OF ADHESIONS  Anesthesia type: general    Patient location: PACU  Last vitals:   Vitals Value Taken Time   /91 1/2/2020 12:20 PM   Temp 36.8  C (98.2  F) 1/2/2020 11:29 AM   Pulse 58 1/2/2020 12:27 PM   Resp 16 1/2/2020 12:27 PM   SpO2 94 % 1/2/2020 12:27 PM   Vitals shown include unvalidated device data.  Post vital signs: stable  Level of consciousness: awake and responds to simple questions  Post-anesthesia pain: pain controlled  Post-anesthesia nausea and vomiting: no  Pulmonary: unassisted, return to baseline  Cardiovascular: stable and blood pressure at baseline  Hydration: adequate  Anesthetic events: no    QCDR Measures:  ASA# 11 - Naya-op Cardiac Arrest: ASA11B - Patient did NOT experience unanticipated cardiac arrest  ASA# 12 - Naya-op Mortality Rate: ASA12B - Patient did NOT die  ASA# 13 - PACU Re-Intubation Rate: ASA13B - Patient did NOT require a new airway mgmt  ASA# 10 - Composite Anes Safety: ASA10A - No serious adverse event    Additional Notes:

## 2021-06-04 NOTE — ANESTHESIA CARE TRANSFER NOTE
Last vitals:   Vitals:    01/02/20 1129   BP: 149/85   Pulse: 70   Resp: 15   Temp: 36.8  C (98.2  F)   SpO2: 97%     Patient's level of consciousness is awake  Spontaneous respirations: yes  Maintains airway independently: yes  Dentition unchanged: yes  Oropharynx: oropharynx clear of all foreign objects    QCDR Measures:  ASA# 20 - Surgical Safety Checklist: WHO surgical safety checklist completed prior to induction    PQRS# 430 - Adult PONV Prevention: 4558F - Pt received => 2 anti-emetic agents (different classes) preop & intraop  ASA# 8 - Peds PONV Prevention: NA - Not pediatric patient, not GA or 2 or more risk factors NOT present  PQRS# 424 - Naya-op Temp Management: 4559F - At least one body temp DOCUMENTED => 35.5C or 95.9F within required timeframe  PQRS# 426 - PACU Transfer Protocol: - Transfer of care checklist used  ASA# 14 - Acute Post-op Pain: ASA14B - Patient did NOT experience pain >= 7 out of 10

## 2021-06-05 NOTE — PROGRESS NOTES
HPI: Pt is here for follow up of a pd cath placement.  She is doing well. Her appetite is good, and bowel function regular.  No fevers or chills. Ambulating without problems.       /66 (Patient Site: Right Arm, Patient Position: Sitting, Cuff Size: Adult Regular)   Pulse 71   Wt (!) 231 lb (104.8 kg)   SpO2 97%   BMI 42.59 kg/m        EXAM: This is a  64 y.o. WOMAN in no distress  GENERAL: Appears well  CHEST clear  CVS S1S2 NSR  ABDOMEN: Soft, non-tender. Incisions look good no signs of infection  EXT: warm, moves without difficulty      Assessment/Plan:   S/P PD cath placement  Sites look good,   Starting pd teaching in two days.  Follow up as needed.    Rambo Potter MD  [facility] Department of Surgery

## 2021-06-16 PROBLEM — N18.6 END STAGE RENAL DISEASE (H): Status: ACTIVE | Noted: 2019-12-02

## 2021-06-20 NOTE — LETTER
Letter by Rambo Potter MD at      Author: Rambo Potter MD Service: -- Author Type: --    Filed:  Encounter Date: 1/20/2020 Status: Signed         Nithya Reeder MD  [address] [facility] [address]                                  January 20, 2020    Patient: Radha De Jesus   MR Number: [id number]   YOB: 1955   Date of Visit: 1/20/2020     Dear Dr. Lilli MD:    Thank you for referring Radha De Jesus to me for evaluation. Below are the relevant portions of my assessment and plan of care.    If you have questions, please do not hesitate to call me. I look forward to following Radha along with you.    Sincerely,        Rambo Potter MD            MD Abbey Bermeo Kyle L, MD  1/20/2020 10:02 AM  Sign when Signing Visit  HPI: Pt is here for follow up of a pd cath placement.  She is doing well. Her appetite is good, and bowel function regular.  No fevers or chills. Ambulating without problems.       /66 (Patient Site: Right Arm, Patient Position: Sitting, Cuff Size: Adult Regular)   Pulse 71   Wt (!) 231 lb (104.8 kg)   SpO2 97%   BMI 42.59 kg/m         EXAM: This is a  64 y.o. WOMAN in no distress  GENERAL: Appears well  CHEST clear  CVS S1S2 NSR  ABDOMEN: Soft, non-tender. Incisions look good no signs of infection  EXT: warm, moves without difficulty      Assessment/Plan:   S/P PD cath placement  Sites look good,   Starting pd teaching in two days.  Follow up as needed.    Rambo Potter MD  [facility] Department of Surgery

## 2021-06-20 NOTE — LETTER
Letter by Rambo Potter MD at      Author: Rambo Potter MD Service: -- Author Type: --    Filed:  Encounter Date: 2019 Status: Signed         Nithya Reeder MD  [address] [facility] [address]                                  2019    Patient: Radha De Jesus   MR Number: [id number]   YOB: 1955   Date of Visit: 2019     Dear Dr. Lilli MD:    Thank you for referring Radha De Jesus to me for evaluation. Below are the relevant portions of my assessment and plan of care.    If you have questions, please do not hesitate to call me. I look forward to following Radha along with you.    Sincerely,        Rambo Potter MD          CC  MD Abbey Bermeo Kyle L, MD  2019 11:18 AM  Sign when Signing Visit  [facility] Consult    HPI:    64 y.o. year old female who I have been consulted by Nithya Reeder MD for evaluation of Consult (Peritoneal catheter placement )  I been asked to see the patient today in clinic for evaluation of peritoneal dialysis catheter placement.  She is a patient of Dr. Nithya Reeder and also Dr. Nithya Steen is her nephrologist.  After looking her options she is working on a transplant evaluation and will likely need dialysis but wants to have a peritoneal dialysis catheter placed to initiate her dialysis.  She is gone to the course already with the peritoneal dialysis  and has a good understanding of what the options are and how it works.    Allergies:Ciprofloxacin and Sulfa (sulfonamide antibiotics)    Past Medical History:   Diagnosis Date   ? Hypertension    ? Sleep apnea     Uses CPAP       Past Surgical History:   Procedure Laterality Date   ? APPENDECTOMY     ?  SECTION, CLASSIC     ? HERNIA REPAIR         CURRENT MEDS:  Current Outpatient Medications on File Prior to Visit   Medication Sig Dispense Refill   ? amLODIPine (NORVASC) 5 MG tablet Take 5 mg by mouth.     ? atorvastatin  (LIPITOR) 10 MG tablet Take 10 mg by mouth.     ? blood glucose test (ACCU-CHEK DESIREE PLUS TEST STRP) strips      ? blood-glucose meter (ACCU-CHEK DESIREE PLUS METER) Misc      ? carvedilol (COREG) 12.5 MG tablet Take 12.5 mg by mouth.     ? cephalexin (KEFLEX) 250 MG capsule 1 capsule daily at bedtime or as directed.     ? furosemide (LASIX) 20 MG tablet Take 20 mg by mouth.     ? lisinopril (PRINIVIL,ZESTRIL) 20 MG tablet Take 30 mg by mouth daily     ? magnesium oxide (MAG-OX) 400 mg (241.3 mg magnesium) tablet OTC (dose change to) one tab twice daily     ? nateglinide (STARLIX) 60 MG tablet Take 60 mg by mouth.     ? sodium bicarbonate 650 MG tablet   11   ? triamcinolone (KENALOG) 0.1 % ointment Apply topically.       No current facility-administered medications on file prior to visit.        History reviewed. No pertinent family history.     reports that she has never smoked. She has never used smokeless tobacco. She reports previous alcohol use. She reports that she does not use drugs.    Review of Systems:  Negative except end-stage renal disease need for dialysis.     OBJECTIVE:  Vitals:    12/02/19 1002   BP: 128/70   Patient Site: Right Arm   Patient Position: Sitting   Cuff Size: Adult Regular   Pulse: (!) 56   SpO2: 98%   Weight: (!) 229 lb (103.9 kg)     There is no height or weight on file to calculate BMI.    EXAM:  GENERAL: This is a well-developed 64 y.o. female who appears her stated age  HEAD: normocephalic  HEENT: Pupils equal and reactive bilaterally  MOUTH: mucus membranes intact  CARDIAC: RRR without murmur  CHEST/LUNG:  Clear to auscultation  ABDOMEN: Soft, nontender, nondistended, no masses  EXTREMITIES: Grossly normal, warm,   NEUROLOGIC: Focally intact, nonfocal  INTEGUMENT: No open lesions or ulcers  VASCULAR: Pulses intact, symmetrical upper and lower extremities.        LABS:  No results found for: WBC, HGB, HCT, MCV, PLT  INR/Prothrombin Time      No results found for: HGBA1C  No  results found for: ALT, AST, GGT, ALKPHOS, BILITOT         Assessment/Plan:   1. End stage renal disease (H)  We discussed today about the options of peritoneal dialysis she like a catheter placed  - Verify informed consent for procedure; Standing  - Verify informed consent for Anesthesia; Standing  - Skin prep - Clip hair as needed; Standing  - Apply 2% Chlorhexidine Gluconate cloth (Aldair Cloth) to surgical area.; Standing  - Insert and maintain IV; Standing  - sodium chloride bacteriostatic 0.9 % injection 0.1-0.3 mL  - sodium chloride flush 3 mL (NS)  - NaCl 0.9% IR 0.9 % irrigation solution 1,000 mL (NS)  - Case Request: INSERTION, CATHETER, PERITONEAL, LAPAROSCOPIC; Standing  - Place sequential compression device; Standing  - ceFAZolin 2 g in dextrose 5% 100 mL (ANCEF)  - Case Request: INSERTION, CATHETER, PERITONEAL, LAPAROSCOPIC      No follow-ups on file.     Rambo Potter MD  [facility] Department of Surgery

## 2021-07-03 NOTE — ADDENDUM NOTE
Addendum Note by Gilberto Napoles SRNA at 1/2/2020 12:39 PM     Author: Gilberto Napoles SRNA Service: -- Author Type: Student Nurse Anesthetist    Filed: 1/2/2020 12:39 PM Date of Service: 1/2/2020 12:39 PM Status: Signed    : Gilberto Napoles SRNA (Student Nurse Anesthetist)       Addendum  created 01/02/20 1239 by Gilberto Napoles SRNA    Flowsheet data copied forward, Intraprocedure Flowsheets edited

## 2021-10-01 ENCOUNTER — DOCUMENTATION ONLY (OUTPATIENT)
Dept: TRANSPLANT | Facility: CLINIC | Age: 66
End: 2021-10-01

## 2021-11-01 NOTE — TELEPHONE ENCOUNTER
Patient called to discuss bariatric surgery versus MWM. I explained the HP phone calls to her (criteria should she proceed with bariatric surgery) and suggested if she was uncertain about going through with surgery that she would at least have the 5 calls done (to take approximately 3 months to complete) and if she decided no surgery, she basically had nothing to lose.   I submitted the  referral electronically today and told her someone would be calling her either this week yet or early next week to register. She would like  to call her cell mid-to-late morning per our conversation today.   
Quality 111:Pneumonia Vaccination Status For Older Adults: Pneumococcal Vaccination Previously Received
Quality 130: Documentation Of Current Medications In The Medical Record: Current Medications Documented
Detail Level: Detailed
Quality 226: Preventive Care And Screening: Tobacco Use: Screening And Cessation Intervention: Patient screened for tobacco use and is an ex/non-smoker

## 2023-10-06 ENCOUNTER — TELEPHONE (OUTPATIENT)
Dept: TRANSPLANT | Facility: CLINIC | Age: 68
End: 2023-10-06
Payer: COMMERCIAL

## 2023-10-06 NOTE — TELEPHONE ENCOUNTER
Patient underwent transplant evaluation at [facility] and is scheduled for living donor transplant on 10/24. Will leave evaluation open until transplanted and then bring to committee.

## 2023-11-09 ENCOUNTER — TELEPHONE (OUTPATIENT)
Dept: TRANSPLANT | Facility: CLINIC | Age: 68
End: 2023-11-09
Payer: COMMERCIAL

## 2023-11-09 NOTE — TELEPHONE ENCOUNTER
Called Radha and was only able to leave a voice message. She had a LD transplant scheduled at [facility] for 10/24 and it was postponed. Called to ask her if she has been transplanted since, or has a new transplant date or if she is interested in coming here. Asked her to return my call so we can get updated. Provided my contact information and her assigned coordinators phone number.

## 2023-11-20 ENCOUNTER — DOCUMENTATION ONLY (OUTPATIENT)
Dept: TRANSPLANT | Facility: CLINIC | Age: 68
End: 2023-11-20
Payer: COMMERCIAL

## 2023-11-22 ENCOUNTER — COMMITTEE REVIEW (OUTPATIENT)
Dept: TRANSPLANT | Facility: CLINIC | Age: 68
End: 2023-11-22
Payer: COMMERCIAL

## 2023-11-22 NOTE — LETTER
November 22, 2023    Radha PRADIP Liza   [address]  [facility] [address]      Dear Ms. De Jesus,   The purpose of this letter is to let you know that on 11/22/23 the [facility] Health Multi-Disciplinary Selection Team reviewed your transplant evaluation.  Based on the results of your evaluation and the selection criteria used by our program, the decision was made to end your evaluation.  This is because you are currently undergoing transplant evaluation and listing at [facility].   Important things you should know:  If you would like to discuss the decision, or if your medical status changes you may schedule a return visits with your doctor by calling [phone] and asking to speak to your transplant coordinator.  We recommend that you continue to follow up with your primary care doctor in order to manage your health concerns.  We want you to know that our program has physician and surgeon coverage 24 hours a day, 365 days a year. In addition, our transplant surgeons and physicians will not be on call for two or more transplant programs more than 30 miles apart unless the circumstances have been reviewed and approved by the [employer]aring (UNOS) Membership and Professional Standards Committee (MPSC). Finally, our primary physician and primary surgeons are not designated as the primary surgeon or primary physician at more than 1 transplant hospital. If this coverage changes or there are substantial program changes, you will be notified in writing by letter.   Attached is a letter from UN[employer] that describes the services and information offered to patients by UNOS and the Organ Procurement and Transplantation Network (OPTN).    Thank you for allowing us to participate in your care.  We wish you well.  Sincerely,  THOMAS Steevns       Solid Organ Transplant  [facility]    Enclosures: UNOS Letter  cc: Care Team    The Organ  Procurement and Transplantation Network Toll-free patient services line:  0-389-891-1130  Your resource for organ transplant information    Staffed 8:30 am - 5:00 pm ET Monday - Friday Leave a message 24/7 to receive a call back    The Organ Procurement and Transplantation Network (OPTN) is the national transplant system. It makes the policies that decide how donated organs are matched to patients waiting for a transplant. The OPTN:    Makes sure donated organs get matched to people on the transplant waiting list  Tells people about the donation and transplant processes  Makes sure that the public knows about the need for more organ and tissue donations    The OPTN has a free patient services line that you can call to:  Get more information about:  Organ donation and organ transplants  Donation and transplant policies  Get an information kit with:  A list of transplant hospitals  Waiting list information  Talk about any questions you may have about your transplant hospital or organ procurement organization. The staff will do their best to help you or point you to others who may help.  Find out how you can volunteer with the OPTN and help shape transplant policy     The patient services line number is: 1-106-159-9710    Patient services line staff CANNOT answer questions about your own medical care, including:  Waiting list status  Test results  Medical records  You will need to call your transplant hospital for this information.    The following websites have more information about transplantation and donation:    OPTN: https://optn.transplant.hrsa.gov/    For potential living donors and transplant recipients:    Living with transplant: https://www.transplantliving.org/    Living donation process: https://optn.transplant.hrsa.gov/living-donation/    Financial assistance: https://www.livingdonorassistance.org/    Transplantation data: https://www.srtr.org/    Organ donation: https://www.organdonor.gov/    Volunteer  with the OPTN: https://optn.transplant.[facility].gov/get-involved/

## 2023-11-22 NOTE — COMMITTEE REVIEW
Abdominal Committee Review Note     Evaluation Date: 9/23/2019  Committee Review Date: 11/22/2023    Organ being evaluated for: Kidney    Transplant Phase: Evaluation  Transplant Status: Medical Management    Transplant Coordinator: Hilda Garduno  Transplant Surgeon:  Dr. Ashwin Winchester     Referring Physician: Nithya Steen    Primary Diagnosis:   Secondary Diagnosis:     Committee Review Members:  Nephrology Virgilio Valenzuela MD, Shade Jones, APRN CNP, Clinton Chandler MD   Nutrition Jaelyn Guillaume, DEYSI   Pharmacist Gertrude Morales, [facility]    - Clinical Yolis Mireles, [facility]   Transplant ARVIN HOLLIDAY, RN, Lluvia Treadwell, RN, Sabra Anaya, RN, Yolis Leiva, RN, Evy Mandujano, RN, Tova Worley, THOMAS, Nyasia Collazo, RN   Transplant Surgery Ashwin Winchester MD       Transplant Eligibility: Irreversible chronic kidney disease treated w/dialysis or expected need for dialysis    Committee Review Decision: Declined    Relative Contraindications: None    Absolute Contraindications: None    Committee Chair Ashwin Winchester MD verbally attested to the committee's decision.    Committee Discussion Details: Patient currently inactive on the [facility] transplant list, had been scheduled for a LDKT but now in a waiting period with them due to a parathyroid adenoma. Patient in October not interested in pursuing our program, but now haven't been able to reach her. Committee approved ending evaluation due to no contact and being listed at another center.